# Patient Record
Sex: FEMALE | Race: WHITE
[De-identification: names, ages, dates, MRNs, and addresses within clinical notes are randomized per-mention and may not be internally consistent; named-entity substitution may affect disease eponyms.]

---

## 2018-10-30 ENCOUNTER — HOSPITAL ENCOUNTER (EMERGENCY)
Dept: HOSPITAL 97 - ER | Age: 23
Discharge: HOME | End: 2018-10-30
Payer: SELF-PAY

## 2018-10-30 DIAGNOSIS — Z72.0: ICD-10-CM

## 2018-10-30 DIAGNOSIS — J06.9: Primary | ICD-10-CM

## 2018-10-30 PROCEDURE — 99283 EMERGENCY DEPT VISIT LOW MDM: CPT

## 2018-10-30 NOTE — ER
Nurse's Notes                                                                                     

 [facility]                                                                

Name: Kati Rosaod                                                                               

Age: 23 yrs                                                                                       

Sex: Female                                                                                       

: 1995                                                                                   

MRN: [id number]                                                                                   

Arrival Date: 10/30/2018                                                                          

Time: 18:08                                                                                       

Account#: [id number]                                                                            

Bed 27                                                                                            

Private MD: None, None                                                                            

Diagnosis: Fever, unspecified;Acute upper respiratory infection, unspecified                      

                                                                                                  

Presentation:                                                                                     

10/30                                                                                             

18:32 Presenting complaint: Patient states: she woke up with body aches, ear ache, sore       kr2 

      throat, runny nose and cough this morning. Transition of care: patient was not received     

      from another setting of care. Onset of symptoms was 2018. Risk Assessment:      

      Do you want to hurt yourself or someone else? Patient reports no desire to harm self or     

      others. Initial Sepsis Screen: Does the patient meet any 2 criteria? No. Patient's          

      initial sepsis screen is negative. Does the patient have a suspected source of              

      infection? No. Patient's initial sepsis screen is negative. Care prior to arrival: None.    

18:32 Method Of Arrival: Ambulatory                                                           [facility] 

18:32 Acuity: JOSE ANTONIO 5                                                                           kr2 

                                                                                                  

Triage Assessment:                                                                                

18:37 General: Appears in no apparent distress. uncomfortable, well groomed, well developed,  kr2 

      well nourished, Behavior is calm, cooperative, appropriate for age. Pain: Complains of      

      pain in right ear, throat Pain currently is 3 out of 10 on a pain scale. Quality of         

      pain is described as aching, tender, Is continuous, Alleviated by nothing. EENT: Nares      

      with drainage noted bilaterally Oral mucosa is moist. Reports pain in right ear. Neuro:     

      Level of Consciousness is awake, alert, obeys commands, Oriented to person, place,          

      time, situation, Appropriate for age. Cardiovascular: Capillary refill < 3 seconds in       

      bilateral fingers Patient's skin is warm and dry. Respiratory: Reports cough that is        

      non-productive, Airway is patent Respiratory effort is even, unlabored, Respiratory         

      pattern is regular, symmetrical. GI: Patient currently denies nausea. : Denies            

      burning with urination. Derm: Skin is intact, is healthy with good turgor, Skin is          

      pink, warm \T\ dry. Musculoskeletal: Circulation, motion, and sensation intact.             

                                                                                                  

OB/GYN:                                                                                           

18:35 LMP N/A - Birth control method                                                          kr2 

                                                                                                  

Historical:                                                                                       

- Allergies:                                                                                      

18:35 No Known Allergies;                                                                     kr2 

- Home Meds:                                                                                      

18:35 Mirena IUD [Active];                                                                    kr2 

- PMHx:                                                                                           

18:35 None;                                                                                   kr2 

- PSHx:                                                                                           

18:35 None;                                                                                   kr2 

                                                                                                  

- Immunization history:: Adult Immunizations unknown.                                             

- Social history:: Smoking status: Patient uses tobacco products, denies chronic                  

  smoking, but will smoke occasionally.                                                           

- Ebola Screening: : No symptoms or risks identified at this time.                                

- Family history:: not pertinent.                                                                 

                                                                                                  

                                                                                                  

Screenin:35 Abuse screen: Denies threats or abuse. Denies injuries from another. Nutritional        kr2 

      screening: No deficits noted. Tuberculosis screening: No symptoms or risk factors           

      identified. Fall Risk None identified.                                                      

                                                                                                  

Assessment:                                                                                       

18:42 Reassessment: See triage assessment. Per Dr. Agrawal, urvashi flu swab, lab notified    kr2 

      and no swab done.                                                                           

                                                                                                  

Vital Signs:                                                                                      

18:35  / 90; Pulse 88; Resp 17; Temp 98.3; Pulse Ox 99% ; Weight 76.2 kg; Height 5 ft.  kr2 

      4 in. (162.56 cm); Pain 3/10;                                                               

18:35 Body Mass Index 28.84 (76.20 kg, 162.56 cm)                                             kr2 

                                                                                                  

ED Course:                                                                                        

18:08 Patient arrived in ED.                                                                  mr  

18:09 None, None is Private Physician.                                                        mr  

18:20 James Agrawal MD is Attending Physician.                                             cindy 

18:23 Vania Whiting, BLANCA is Primary Nurse.                                                     kr2 

18:34 Triage completed.                                                                       kr2 

18:38 James Agrawal MD is Referral Physician.                                              cindy 

18:40 Arm band placed on.                                                                     kr2 

18:40 Patient has correct armband on for positive identification. Bed in low position. Call   kr2 

      light in reach. Side rails up X 1. Door closed. Head of bed elevated.                       

18:40 No provider procedures requiring assistance completed. Patient did not have IV access   kr2 

      during this emergency room visit.                                                           

                                                                                                  

Administered Medications:                                                                         

18:50 Drug: Zithromax 500 mg Route: PO;                                                       kr2 

18:55 Follow up: Response: Medication administered at discharge.                              kr2 

18:50 Drug: Tamiflu 75 mg Route: PO;                                                          kr2 

19:02 Follow up: Response: Medication administered at discharge.                              kr2 

                                                                                                  

                                                                                                  

Outcome:                                                                                          

18:39 Discharge ordered by MD.                                                                cindy 

18:41 Condition: good                                                                         kr2 

18:53 Discharged to home ambulatory.                                                          kr2 

18:53 Discharge instructions given to patient, Instructed on discharge instructions, follow       

      up and referral plans. medication usage, Demonstrated understanding of instructions,        

      follow-up care, medications, Prescriptions given X 3.                                       

19:02 Patient left the ED.                                                                    kr2 

                                                                                                  

Signatures:                                                                                       

James Agrawal MD MD cha Rivera, Mary mr Reaves, Karey, RN                       RN   kr2                                                  

                                                                                                  

Corrections: (The following items were deleted from the chart)                                    

19:02 18:54 Response: No adverse reaction; Medication administered at discharge. kr2          kr2 

                                                                                                  

**************************************************************************************************

## 2019-03-09 ENCOUNTER — HOSPITAL ENCOUNTER (EMERGENCY)
Dept: HOSPITAL 97 - ER | Age: 24
Discharge: HOME | End: 2019-03-09
Payer: SELF-PAY

## 2019-03-09 DIAGNOSIS — Z91.040: ICD-10-CM

## 2019-03-09 DIAGNOSIS — J06.9: Primary | ICD-10-CM

## 2019-03-09 DIAGNOSIS — Z91.048: ICD-10-CM

## 2019-03-09 PROCEDURE — 87070 CULTURE OTHR SPECIMN AEROBIC: CPT

## 2019-03-09 PROCEDURE — 87804 INFLUENZA ASSAY W/OPTIC: CPT

## 2019-03-09 PROCEDURE — 99282 EMERGENCY DEPT VISIT SF MDM: CPT

## 2019-03-09 PROCEDURE — 87081 CULTURE SCREEN ONLY: CPT

## 2019-03-09 NOTE — EDPHYS
Physician Documentation                                                                           

 [facility]                                                                

Name: Kati Rosado                                                                               

Age: 23 yrs                                                                                       

Sex: Female                                                                                       

: 1995                                                                                   

MRN: [id number]                                                                                   

Arrival Date: 2019                                                                          

Time: 17:53                                                                                       

Account#: [id number]                                                                            

Bed Treatment                                                                                     

Private MD:                                                                                       

ED Physician James Agrawal                                                                      

HPI:                                                                                              

                                                                                             

18:55 This 23 yrs old  Female presents to ER via Ambulatory with complaints of Ear   jr8 

      Pain.                                                                                       

18:55 Patient stated that for the past 5 days she has had sinus congestion, cough, body       jr8 

      aches, ear pain, soar throat. Not getting any better with OTC medicine . Severity of        

      symptoms: At their worst the symptoms were moderate in the emergency department the         

      symptoms are unchanged. The patient has not experienced similar symptoms in the past.       

      The patient has not recently seen a physician.                                              

                                                                                                  

OB/GYN:                                                                                           

17:54 LMP 2019                                                                           ch  

                                                                                                  

Historical:                                                                                       

- Allergies:                                                                                      

17:54 Latex, Natural Rubber;                                                                  ch  

- Home Meds:                                                                                      

17:54 Mirena IUD [Active];                                                                    ch  

- PMHx:                                                                                           

17:54 None;                                                                                   ch  

- PSHx:                                                                                           

17:54 None;                                                                                   ch  

                                                                                                  

- Immunization history:: Adult Immunizations up to date, Flu vaccine is not up to date.           

- Social history:: Smoking status: Patient uses tobacco products, denies chronic                  

  smoking, but will smoke occasionally, Patient uses alcohol, weekly. Patient/guardian            

  denies using street drugs.                                                                      

- Ebola Screening: : Patient negative for fever greater than or equal to 101.5 degrees            

  Fahrenheit, and additional compatible Ebola Virus Disease symptoms Patient denies               

  exposure to infectious person Patient denies travel to an Ebola-affected area in the            

  21 days before illness onset No symptoms or risks identified at this time.                      

                                                                                                  

                                                                                                  

ROS:                                                                                              

18:55 Eyes: Negative for injury, pain, redness, and discharge, Neck: Negative for injury,     jr8 

      pain, and swelling, Cardiovascular: Negative for chest pain, palpitations, and edema,       

      Abdomen/GI: Negative for abdominal pain, nausea, vomiting, diarrhea, and constipation,      

      Back: Negative for injury and pain, MS/Extremity: Negative for injury and deformity,        

      Skin: Negative for injury, rash, and discoloration, Neuro: Negative for headache,           

      weakness, numbness, tingling, and seizure.                                                  

18:55 Constitutional: Positive for body aches, chills.                                            

18:55 ENT: Positive for rhinorrhea, sinus congestion, sinus pain, sore throat.                    

18:55 Respiratory: Positive for cough, Negative for dyspnea on exertion, shortness of breath,     

      sputum production, wheezing.                                                                

                                                                                                  

Exam:                                                                                             

18:55 Eyes:  Pupils equal round and reactive to light, extra-ocular motions intact.  Lids and jr8 

      lashes normal.  Conjunctiva and sclera are non-icteric and not injected.  Cornea within     

      normal limits.  Periorbital areas with no swelling, redness, or edema. ENT:  Nares          

      patent. Mild erythema to bilateral turbinates with clear rhinorrhea. No septal              

      abnormalities noted.  Tympanic membranes are normal and external auditory canals are        

      clear.  Oropharynx with no redness, swelling, or masses, exudates, or evidence of           

      obstruction, uvula midline.  Mucous membranes moist. Neck:  Trachea midline, no             

      thyromegaly or masses palpated, and no cervical lymphadenopathy.  Supple, full range of     

      motion without nuchal rigidity, or vertebral point tenderness.  No Meningismus.             

      Cardiovascular:  Regular rate and rhythm with a normal S1 and S2.  No gallops, murmurs,     

      or rubs.  Normal PMI, no JVD.  No pulse deficits. Respiratory:  Lungs have equal breath     

      sounds bilaterally, clear to auscultation and percussion.  No rales, rhonchi or wheezes     

      noted.  No increased work of breathing, no retractions or nasal flaring. Abdomen/GI:        

      Soft, non-tender, with normal bowel sounds.  No distension or tympany.  No guarding or      

      rebound.  No evidence of tenderness throughout. Back:  No spinal tenderness.  No            

      costovertebral tenderness.  Full range of motion. Skin:  Warm, dry with normal turgor.      

      Normal color with no rashes, no lesions, and no evidence of cellulitis. MS/ Extremity:      

      Pulses equal, no cyanosis.  Neurovascular intact.  Full, normal range of motion. Neuro:     

       Awake and alert, GCS 15, oriented to person, place, time, and situation.  Cranial          

      nerves II-XII grossly intact.  Motor strength 5/5 in all extremities.  Sensory grossly      

      intact.  Cerebellar exam normal.  Normal gait.                                              

                                                                                                  

Vital Signs:                                                                                      

17:54  / 85; Pulse 71; Resp 16; Temp 98.3; Pulse Ox 99% on R/A; Weight 83.91 kg; Height ch  

      5 ft. 4 in. (162.56 cm); Pain 7/10;                                                         

17:54 Body Mass Index 31.75 (83.91 kg, 162.56 cm)                                               

                                                                                                  

MDM:                                                                                              

18:03 Patient medically screened.                                                             [facility] 

18:55 Data reviewed: vital signs, nurses notes, lab test result(s), and as a result, I will   jr8 

      discharge patient. Data interpreted: Pulse oximetry: on room air is 99 %.                   

      Interpretation: normal. Counseling: I had a detailed discussion with the patient and/or     

      guardian regarding: the historical points, exam findings, and any diagnostic results        

      supporting the discharge/admit diagnosis, lab results, the need for outpatient follow       

      up, a family practitioner, to return to the emergency department if symptoms worsen or      

      persist or if there are any questions or concerns that arise at home.                       

                                                                                                  

                                                                                             

17:56 Order name: Flu; Complete Time: 18:55                                                     

                                                                                             

17:56 Order name: Strep; Complete Time: 18:55                                                   

                                                                                             

18:54 Order name: Throat Culture                                                              EDMS

                                                                                                  

Administered Medications:                                                                         

No medications were administered                                                                  

                                                                                                  

                                                                                                  

Disposition:                                                                                      

19 18:58 Discharged to Home. Impression: Acute upper respiratory infection, unspecified.    

- Condition is Stable.                                                                            

- Discharge Instructions: Upper Respiratory Infection, Adult.                                     

- Prescriptions for Prednisone 20 mg Oral Tablet - take 1 tablet by ORAL route once               

  daily for 5 days; 5 tablet. Zithromax Z- Jayme 250 mg Oral Tablet - take 1 tablet by              

  ORAL route as directed for 5 days Day 1 - take two (2) tablets one time. Day 2, 3, 4            

  , 5 take one (1) tablet once daily.; 6 tablet. Guaifenesin AC 10- 100 mg/5 mL Oral              

  Liquid - take 10 milliliter by ORAL route every 4 hours As needed; 240 milliliter.              

- Work release form, Medication Reconciliation Form, Thank You Letter, Antibiotic                 

  Education, Prescription Opioid Use form.                                                        

- Follow up: Private Physician; When: 5 - 6 days; Reason: Recheck today's complaints,             

  Continuance of care, Re-evaluation by your physician.                                           

- Problem is new.                                                                                 

- Symptoms have improved.                                                                         

                                                                                                  

                                                                                                  

                                                                                                  

Addendum:                                                                                         

2019                                                                                        

     09:28 Co-signature as Attending Physician, James Agrawal MD I agree with the assessment and  c
ha

           plan of care.                                                                          

                                                                                                  

Signatures:                                                                                       

Dispatcher [employer]                           EDDenisse Rocha RN RN ch Anderson, Corey, MD MD cha Smirch, Shelby, RN RN                                                      

Héctor Jones PA PA   jr8                                                  

                                                                                                  

Corrections: (The following items were deleted from the chart)                                    

                                                                                             

19:32 18:58 2019 18:58 Discharged to Home. Impression: Acute upper respiratory          ss  

      infection, unspecified. Condition is Stable. Forms are Medication Reconciliation Form,      

      Thank You Letter, Antibiotic Education, Prescription Opioid Use. Follow up: Private         

      Physician; When: 5 - 6 days; Reason: Recheck today's complaints, Continuance of care,       

      Re-evaluation by your physician. Problem is new. Symptoms have improved. jr8                

                                                                                                  

**************************************************************************************************

## 2019-03-09 NOTE — ER
Nurse's Notes                                                                                     

 [facility]                                                                

Name: Kati Rosado                                                                               

Age: 23 yrs                                                                                       

Sex: Female                                                                                       

: 1995                                                                                   

MRN: [id number]                                                                                   

Arrival Date: 2019                                                                          

Time: 17:53                                                                                       

Account#: [id number]                                                                            

Bed Treatment                                                                                     

Private MD:                                                                                       

Diagnosis: Acute upper respiratory infection, unspecified                                         

                                                                                                  

Presentation:                                                                                     

                                                                                             

17:53 Presenting complaint: Patient states: terrie ear pain, cough, congestion, yellow mucous,   ch  

      started 4-5 days ago, sore throat as well. Transition of care: patient was not received     

      from another setting of care. Onset of symptoms was 2019. Risk Assessment: Do     

      you want to hurt yourself or someone else?. Initial Sepsis Screen: Does the patient         

      meet any 2 criteria? No. Patient's initial sepsis screen is negative. Does the patient      

      have a suspected source of infection? No. Patient's initial sepsis screen is negative.      

      Care prior to arrival: Medication(s) given: amoxicillin.                                    

17:53 Method Of Arrival: Ambulatory                                                             

17:53 Acuity: JOSE ANTONIO 4                                                                             

                                                                                                  

Triage Assessment:                                                                                

17:54 General: Appears in no apparent distress. comfortable, Behavior is calm, cooperative,   ch  

      appropriate for age. Pain: Complains of pain in throat Pain currently is 7 out of 10 on     

      a pain scale. EENT: Reports nasal congestion nasal discharge pain in left ear and right     

      ear when swallowing. Respiratory: Airway is patent Respiratory effort is even,              

      unlabored.                                                                                  

                                                                                                  

OB/GYN:                                                                                           

17:54 [facility] 2019                                                                             

                                                                                                  

Historical:                                                                                       

- Allergies:                                                                                      

17:54 Latex, Natural Rubber;                                                                    

- Home Meds:                                                                                      

17:54 Mirena IUD [Active];                                                                    ch  

- PMHx:                                                                                           

17:54 None;                                                                                   ch  

- PSHx:                                                                                           

17:54 None;                                                                                     

                                                                                                  

- Immunization history:: Adult Immunizations up to date, Flu vaccine is not up to date.           

- Social history:: Smoking status: Patient uses tobacco products, denies chronic                  

  smoking, but will smoke occasionally, Patient uses alcohol, weekly. Patient/guardian            

  denies using street drugs.                                                                      

- Ebola Screening: : Patient negative for fever greater than or equal to 101.5 degrees            

  Fahrenheit, and additional compatible Ebola Virus Disease symptoms Patient denies               

  exposure to infectious person Patient denies travel to an Ebola-affected area in the            

  21 days before illness onset No symptoms or risks identified at this time.                      

                                                                                                  

                                                                                                  

Screenin:14 Abuse screen: Denies threats or abuse. Denies injuries from another. Nutritional        ss  

      screening: No deficits noted. Tuberculosis screening: Never had TB. Fall Risk None          

      identified.                                                                                 

                                                                                                  

Assessment:                                                                                       

18:18 General: Appears in no apparent distress. comfortable, Behavior is calm, cooperative.   ss  

      Pain: Complains of pain in right ear and left ear and throat Pain currently is 7 out of     

      10 on a pain scale. Quality of pain is described as aching, tender. Neuro: Level of         

      Consciousness is awake, alert, obeys commands, Oriented to person, place, time,             

      situation. Cardiovascular: Capillary refill < 3 seconds is brisk in bilateral fingers.      

      Respiratory: Airway is patent Respiratory effort is even, unlabored, Respiratory            

      pattern is regular, symmetrical. GI: Patient currently denies diarrhea, nausea,             

      vomiting. EENT: Nares are clear Oral mucosa is moist. Throat is clear. Derm: Skin is        

      intact, is healthy with good turgor, Skin is dry, Skin is pink, warm \T\ dry. normal.       

      Musculoskeletal: Circulation, motion, and sensation intact. Range of motion: intact in      

      all extremities.                                                                            

                                                                                                  

Vital Signs:                                                                                      

17:54  / 85; Pulse 71; Resp 16; Temp 98.3; Pulse Ox 99% on R/A; Weight 83.91 kg; Height ch  

      5 ft. 4 in. (162.56 cm); Pain 7/10;                                                         

17:54 Body Mass Index 31.75 (83.91 kg, 162.56 cm)                                               

                                                                                                  

ED Course:                                                                                        

17:53 Patient arrived in ED.                                                                    

17:54 Triage completed.                                                                         

17:54 Arm band placed on left wrist. Patient placed in waiting room. flu and strep.             

17:56 Héctor Jones PA is PHCP.                                                               [facility] 

17:56 James Agrawal MD is Attending Physician.                                             [id number] 

18:14 Patient has correct armband on for positive identification. Bed in low position. Call   ss  

      light in reach.                                                                             

18:14 No provider procedures requiring assistance completed. Patient did not have IV access   ss  

      during this emergency room visit.                                                           

19:31 Rubia Camilo, RN is Primary Nurse.                                                    ss  

                                                                                                  

Administered Medications:                                                                         

No medications were administered                                                                  

                                                                                                  

                                                                                                  

Outcome:                                                                                          

18:58 Discharge ordered by MD.                                                                mar 

19:31 Discharged to home ambulatory.                                                          ss  

19:31 Condition: good                                                                             

19:31 Discharge instructions given to patient, family, Instructed on discharge instructions,      

      follow up and referral plans. medication usage, Demonstrated understanding of               

      instructions, follow-up care, medications, Prescriptions given X 3.                         

19:32 Patient left the ED.                                                                      

                                                                                                  

Signatures:                                                                                       

Denisse Manzanares RN RN                                                      

Rubia Camilo RN RN                                                      

Héctor Jones PA PA   jr8                                                  

                                                                                                  

Corrections: (The following items were deleted from the chart)                                    

17:54 17:53 Care prior to arrival: None. [facility]  

                                                                                                  

**************************************************************************************************

## 2020-12-06 ENCOUNTER — HOSPITAL ENCOUNTER (EMERGENCY)
Dept: HOSPITAL 97 - ER | Age: 25
Discharge: HOME | End: 2020-12-06
Payer: SELF-PAY

## 2020-12-06 DIAGNOSIS — Z20.828: ICD-10-CM

## 2020-12-06 DIAGNOSIS — Z91.040: ICD-10-CM

## 2020-12-06 DIAGNOSIS — J06.9: Primary | ICD-10-CM

## 2020-12-06 DIAGNOSIS — Z91.048: ICD-10-CM

## 2020-12-06 PROCEDURE — 87070 CULTURE OTHR SPECIMN AEROBIC: CPT

## 2020-12-06 PROCEDURE — 87804 INFLUENZA ASSAY W/OPTIC: CPT

## 2020-12-06 PROCEDURE — 87081 CULTURE SCREEN ONLY: CPT

## 2020-12-06 PROCEDURE — 99283 EMERGENCY DEPT VISIT LOW MDM: CPT

## 2020-12-06 NOTE — ER
Nurse's Notes                                                                                     

 [facility]                                                                 

Name: Kati Rosado                                                                               

Age: 25 yrs                                                                                       

Sex: Female                                                                                       

: 1995                                                                                   

MRN: [id number]                                                                                   

Arrival Date: 2020                                                                          

Time: 12:44                                                                                       

Account#: [id number]                                                                            

Bed 5                                                                                             

Private MD:                                                                                       

Diagnosis: Acute upper respiratory infection, unspecified                                         

                                                                                                  

Presentation:                                                                                     

                                                                                             

13:14 Chief complaint: Patient states: Started coughing today, throat started hurting         ca1 

      yesterday. When I cough, it hurts my chest and throat, I almost throw up from coughing      

      at work today. Denies fever. Coronavirus screen: Client denies travel out of the [place]       

      in the last 14 days. cough unrelated to allergies, shortness of breath, Client presents     

      with at least one sign or symptom that may indicate coronavirus-19. Standard/surgical       

      mask placed on the client. Provider contacted for isolation considerations. The client      

      reports previous COVID testing was negative. Date of collection: 2020. Ebola      

      Screen: Patient negative for fever greater than or equal to 101.5 degrees Fahrenheit,       

      and additional compatible Ebola Virus Disease symptoms Patient denies exposure to           

      infectious person. Patient denies travel to an Ebola-affected area in the 21 days           

      before illness onset. No symptoms or risks identified at this time. Initial Sepsis          

      Screen: Does the patient meet any 2 criteria? No. Patient's initial sepsis screen is        

      negative. Does the patient have a suspected source of infection? No. Patient's initial      

      sepsis screen is negative. Risk Assessment: Do you want to hurt yourself or someone         

      else? Patient reports no desire to harm self or others. Onset of symptoms was 2020.                                                                                   

13:14 Method Of Arrival: Ambulatory                                                           ca1 

13:14 Acuity: JOSE ANTONIO 4                                                                           ca1 

                                                                                                  

OB/GYN:                                                                                           

13:16 LMP 2020                                                                          ca1 

                                                                                                  

Historical:                                                                                       

- Allergies:                                                                                      

13:16 Latex, Natural Rubber;                                                                  ca1 

- Home Meds:                                                                                      

13:16 None [Active];                                                                          ca1 

- PMHx:                                                                                           

13:16 None;                                                                                   ca1 

- PSHx:                                                                                           

13:16 None;                                                                                   ca1 

                                                                                                  

- Immunization history:: Adult Immunizations up to date, Flu vaccine is not up to date.           

- Social history:: Smoking status: Patient denies any tobacco usage or history of.                

                                                                                                  

                                                                                                  

Screenin:01 Abuse screen: Denies threats or abuse. Denies injuries from another. Nutritional        ca1 

      screening: No deficits noted. Tuberculosis screening: No symptoms or risk factors           

      identified. Fall Risk None identified.                                                      

                                                                                                  

Assessment:                                                                                       

14:01 Reassessment: Pt seen in triage with MARIO Rizvi. General: Appears in no apparent    ca1 

      distress. comfortable, Behavior is calm, cooperative, appropriate for age. Pain:            

      Complains of pain in throat. Neuro: Level of Consciousness is awake, alert, obeys           

      commands, Oriented to person, place, time, situation. Cardiovascular: Heart tones S1 S2     

      present Capillary refill < 3 seconds Patient's skin is warm and dry. Respiratory:           

      Airway is patent Respiratory effort is even, unlabored, Respiratory pattern is regular,     

      symmetrical, Breath sounds are clear bilaterally. EENT: Throat is clear is pink. Derm:      

      Skin is intact, is healthy with good turgor, Skin is pink, warm \T\ dry. Musculoskeletal:   

      Circulation, motion, and sensation intact. Capillary refill < 3 seconds.                    

14:32 Reassessment: Patient appears in no apparent distress at this time. No changes from     sv  

      previously documented assessment. Patient and/or family updated on plan of care and         

      expected duration. Pain level reassessed. Patient is alert, oriented x 3, equal             

      unlabored respirations, skin warm/dry/pink.                                                 

                                                                                                  

Vital Signs:                                                                                      

13:14  / 77; Pulse 88; Resp 16 S; Temp 97.9(TE); Pulse Ox 100% on R/A; Weight 81.65 kg  ca1 

      (R); Height 5 ft. 4 in. (162.56 cm) (R); Pain 4/10;                                         

14:05  / 81; Pulse 83; Resp 16 S; Pulse Ox 100% on R/A;                                 ca1 

13:14 Body Mass Index 30.90 (81.65 kg, 162.56 cm)                                             ca1 

                                                                                                  

ED Course:                                                                                        

12:44 Patient arrived in ED.                                                                  ds1 

13:16 Triage completed.                                                                       ca1 

13:16 Arm band placed on right wrist.                                                         ca1 

13:30 James Gonzalez PA is PHCP.                                                                cp  

13:32 James Gonzalez PA is PHCP.                                                                cp  

13:32 Washington Ballesteros MD is Attending Physician.                                                cp  

13:32 Flu Sent.                                                                               ca1 

13:32 Strep Sent.                                                                             ca1 

13:32 COVID-19 Sent.                                                                          ca1 

14:01 Patient has correct armband on for positive identification.                             ca1 

14:01 No provider procedures requiring assistance completed. Patient did not have IV access   ca1 

      during this emergency room visit.                                                           

14:29 Mana Christensen, RN is Primary Nurse.                                                  sv  

14:30 Throat Culture Sent.                                                                    sv  

                                                                                                  

Administered Medications:                                                                         

14:09 Drug: Tessalon Perle 200 mg Route: PO;                                                  ca1 

14:32 Follow up: Response: No adverse reaction                                                sv  

                                                                                                  

                                                                                                  

Outcome:                                                                                          

14:45 Discharge ordered by MD.                                                                cp  

14:55 Patient left the ED.                                                                    sv  

14:55 Discharged to home ambulatory.                                                          sv  

14:55 Condition: stable                                                                           

14:55 Discharge instructions given to patient, Instructed on discharge instructions, follow       

      up and referral plans. medication usage, Demonstrated understanding of instructions,        

      follow-up care, medications, Prescriptions given X 1.                                       

                                                                                                  

Addendum:                                                                                         

2020                                                                                        

     17:42 Addendum: COVID-19 Result: Negative result given to RN to notify pt. Notified pt of     a
a5

           negative COVID 19 swab results. Pt advised that even with a negative test result they  

           should remain in isolation until symptom free for 3 days without medication. Pt also   

           advised to return to the ED for worsening symptoms.                                    

                                                                                                  

Signatures:                                                                                       

Mana Christensen, RN                    RN                                                      

Johanna Nj                                ds1                                                  

Rosalina Quiñones, RN                     RN   aa5                                                  

James Gonzalez PA                         PA   Dianne Franco RN                        RN   ca1                                                  

                                                                                                  

**************************************************************************************************

## 2020-12-06 NOTE — EDPHYS
Physician Documentation                                                                           

 [facility]                                                                 

Name: Kati Rosado                                                                               

Age: 25 yrs                                                                                       

Sex: Female                                                                                       

: 1995                                                                                   

MRN: [id number]                                                                                   

Arrival Date: 2020                                                                          

Time: 12:44                                                                                       

Account#: [id number]                                                                            

Bed 5                                                                                             

Private MD:                                                                                       

ED Physician Washington Ballesteros                                                                         

HPI:                                                                                              

                                                                                             

13:30 This 25 yrs old  Female presents to ER via Ambulatory with complaints of Sore  cp  

      Throat, Cough.                                                                              

13:30 The patient presents with sore throat. Onset: The symptoms/episode began/occurred       cp  

      yesterday. Severity of symptoms: in the emergency department the symptoms are               

      unchanged. Associated signs and symptoms: Pertinent positives: cough, Pertinent             

      negatives diarrhea, dysphagia, fever, vomiting.                                             

                                                                                                  

OB/GYN:                                                                                           

13:16 LMP 2020                                                                          ca1 

                                                                                                  

Historical:                                                                                       

- Allergies:                                                                                      

13:16 Latex, Natural Rubber;                                                                  ca1 

- Home Meds:                                                                                      

13:16 None [Active];                                                                          ca1 

- PMHx:                                                                                           

13:16 None;                                                                                   ca1 

- PSHx:                                                                                           

13:16 None;                                                                                   ca1 

                                                                                                  

- Immunization history:: Adult Immunizations up to date, Flu vaccine is not up to date.           

- Social history:: Smoking status: Patient denies any tobacco usage or history of.                

                                                                                                  

                                                                                                  

ROS:                                                                                              

13:35 Constitutional: Negative for fever, poor PO intake.                                     cp  

13:35 Eyes: Negative for injury, pain, redness, and discharge.                                cp  

13:35 ENT: Positive for ear pain, sore throat, Negative for drainage from ear(s), difficulty      

      swallowing, difficulty handling secretions.                                                 

13:35 Respiratory: Positive for cough, Negative for shortness of breath, wheezing.                

13:35 Abdomen/GI: Negative for abdominal pain, nausea, vomiting, and diarrhea.                    

13:35 Neuro: Negative for headache.                                                               

13:35 All other systems are negative.                                                             

                                                                                                  

Exam:                                                                                             

13:40 Constitutional: The patient appears in no acute distress, alert, awake, non-toxic, well cp  

      developed, well nourished.                                                                  

13:40 Head/Face:  Normocephalic, atraumatic.                                                  cp  

13:40 Eyes: Periorbital structures: appear normal, Conjunctiva: normal, no exudate, no            

      injection, Lids and lashes: appear normal, bilaterally.                                     

13:40 ENT: External ear(s): are unremarkable, Ear canal(s): are normal, clear, TM's:              

      dullness, bilaterally, Nose: is normal, Mouth: Lips: moist, Oral mucosa: moist,             

      Posterior pharynx: Airway: no evidence of obstruction, patent, Tonsils: no enlargement,     

      no exudate, swelling, is not appreciated, erythema, that is mild, exudate, is not           

      appreciated.                                                                                

13:40 Neck: ROM/movement: is normal, is supple, no meningismus, no nuchal rigidity, Lymph         

      nodes: no appreciated lymphadenopathy.                                                      

13:40 Chest/axilla: Inspection: normal.                                                           

13:40 Cardiovascular: Rate: normal.                                                               

13:40 Respiratory: the patient does not display signs of respiratory distress,  Respirations:     

      normal, no use of accessory muscles, no retractions, labored breathing, is not present,     

      Breath sounds: decreased breath sounds, are not appreciated, stridor, wheezing: is not      

      appreciated.                                                                                

13:40 Abdomen/GI: Exam negative for discomfort, distension, guarding, Inspection: abdomen         

      appears normal.                                                                             

                                                                                                  

Vital Signs:                                                                                      

13:14  / 77; Pulse 88; Resp 16 S; Temp 97.9(TE); Pulse Ox 100% on R/A; Weight 81.65 kg  ca1 

      (R); Height 5 ft. 4 in. (162.56 cm) (R); Pain 4/10;                                         

14:05  / 81; Pulse 83; Resp 16 S; Pulse Ox 100% on R/A;                                 ca1 

13:14 Body Mass Index 30.90 (81.65 kg, 162.56 cm)                                             ca1 

                                                                                                  

MDM:                                                                                              

14:00 Differential diagnosis: apthous stomatitis, group A strep tonsillitis, peritonsillar    cp  

      abscess pharyngitis, retropharyngeal abcess.                                                

14:44 Patient medically screened.                                                             cp  

14:45 Data reviewed: vital signs, nurses notes, lab test result(s), and as a result, I will   cp  

      discharge patient.                                                                          

14:45 Counseling: I had a detailed discussion with the patient and/or guardian regarding: the cp  

      historical points, exam findings, and any diagnostic results supporting the                 

      discharge/admit diagnosis, lab results, to return to the emergency department if            

      symptoms worsen or persist or if there are any questions or concerns that arise at home.    

                                                                                                  

                                                                                             

13:21 Order name: Flu                                                                         ca1 

                                                                                             

13:21 Order name: Strep                                                                       ca1 

                                                                                             

13:21 Order name: COVID-19                                                                    ca1 

                                                                                             

14:27 Order name: Throat Culture                                                              EDMS

                                                                                                  

Administered Medications:                                                                         

14:09 Drug: Tessalon Perle 200 mg Route: PO;                                                  ca1 

14:32 Follow up: Response: No adverse reaction                                                sv  

                                                                                                  

                                                                                                  

Disposition:                                                                                      

                                                                                             

09:31 Co-signature as Attending Physician, Washington Ballesteros MD.                                    rn  

                                                                                                  

Disposition:                                                                                      

20 14:45 Discharged to Home. Impression: Acute upper respiratory infection, unspecified.    

- Condition is Stable.                                                                            

- Discharge Instructions: Upper Respiratory Infection, Adult, COVID-19.                           

- Prescriptions for Tessalon Perles 100 mg Oral Capsule - take 2 capsule by ORAL route            

  every 8 hours As needed; 20 capsule.                                                            

- Work release form, Medication Reconciliation Form, Thank You Letter, Antibiotic                 

  Education, Prescription Opioid Use form.                                                        

- Follow up: Private Physician; When: 2 - 3 days; Reason: Worsening of condition.                 

- Problem is new.                                                                                 

- Symptoms have improved.                                                                         

                                                                                                  

                                                                                                  

                                                                                                  

Signatures:                                                                                       

Dispatcher [employer]                           EDMS                                                 

Mana Christensen RN                    RN   Washington Orosco MD MD rn Page, Corey, PA PA   cp                                                   

Dianne Greenwood RN                        RN   ca1                                                  

                                                                                                  

Corrections: (The following items were deleted from the chart)                                    

                                                                                             

14:55 14:45 2020 14:45 Discharged to Home. Impression: Acute upper respiratory          sv  

      infection, unspecified. Condition is Stable. Forms are Medication Reconciliation Form,      

      Thank You Letter, Antibiotic Education, Prescription Opioid Use. Follow up: Private         

      Physician; When: 2 - 3 days; Reason: Worsening of condition. Problem is new. Symptoms       

      have improved. cp                                                                           

                                                                                                  

**************************************************************************************************

## 2020-12-10 VITALS — DIASTOLIC BLOOD PRESSURE: 81 MMHG | SYSTOLIC BLOOD PRESSURE: 124 MMHG

## 2020-12-10 VITALS — OXYGEN SATURATION: 100 % | TEMPERATURE: 97.9 F

## 2021-01-15 ENCOUNTER — HOSPITAL ENCOUNTER (EMERGENCY)
Dept: HOSPITAL 97 - ER | Age: 26
Discharge: HOME | End: 2021-01-15
Payer: SELF-PAY

## 2021-01-15 VITALS — SYSTOLIC BLOOD PRESSURE: 108 MMHG | OXYGEN SATURATION: 99 % | DIASTOLIC BLOOD PRESSURE: 88 MMHG

## 2021-01-15 VITALS — TEMPERATURE: 98.4 F

## 2021-01-15 DIAGNOSIS — R09.1: Primary | ICD-10-CM

## 2021-01-15 DIAGNOSIS — Z91.040: ICD-10-CM

## 2021-01-15 DIAGNOSIS — Z91.048: ICD-10-CM

## 2021-01-15 PROCEDURE — 99283 EMERGENCY DEPT VISIT LOW MDM: CPT

## 2021-01-15 PROCEDURE — 71045 X-RAY EXAM CHEST 1 VIEW: CPT

## 2021-01-15 NOTE — ER
Nurse's Notes                                                                                     

 [facility]                                                                 

Name: Kati Rosado                                                                               

Age: 25 yrs                                                                                       

Sex: Female                                                                                       

: 1995                                                                                   

MRN: [id number]                                                                                   

Arrival Date: 01/15/2021                                                                          

Time: 12:02                                                                                       

Account#: [id number]                                                                            

Bed 24                                                                                            

Private MD:                                                                                       

Diagnosis: Pleurisy                                                                               

                                                                                                  

Presentation:                                                                                     

01/15                                                                                             

12:15 Chief complaint: Patient states: Left mid back pain with deep inspiration for 1 week.   ll1 

      Cough since December, she came here for. Covid negative last visit. Coronavirus screen:     

      Client denies travel out of the [place] in the last 14 days. cough unrelated to allergies,     

      Client presents with at least one sign or symptom that may indicate coronavirus-19.         

      Standard/surgical mask placed on the client. Ebola Screen: Patient denies travel to an      

      Ebola-affected area in the 21 days before illness onset. Initial Sepsis Screen: Does        

      the patient meet any 2 criteria? No. Patient's initial sepsis screen is negative. Does      

      the patient have a suspected source of infection? Yes: Productive cough/pneumonia. Risk     

      Assessment: Do you want to hurt yourself or someone else? Patient reports no desire to      

      harm self or others. Onset of symptoms was 2020.                               

12:15 Method Of Arrival: Ambulatory                                                           ll1 

12:15 Acuity: JOSE ANTONIO 4                                                                           ll1 

                                                                                                  

Triage Assessment:                                                                                

12:17 General: Appears in no apparent distress. Behavior is calm, cooperative, appropriate    ll1 

      for age. General: intermittent pain to left mid back with deep inspiration, no pain at      

      this time. . Pain: Denies pain. Neuro: No deficits noted. Cardiovascular: No deficits       

      noted. Respiratory: Reports pain with respiration Airway is patent Trachea midline          

      Respiratory effort is even, unlabored, Respiratory pattern is regular, symmetrical, the     

      patient has mild shortness of breath. Musculoskeletal: Circulation, motion, and             

      sensation intact. Capillary refill < 3 seconds, Range of motion: intact in all              

      extremities, Reports pain in L mid back with inspiration.                                   

                                                                                                  

Historical:                                                                                       

- Allergies:                                                                                      

12:15 Latex, Natural Rubber;                                                                  ll1 

- PMHx:                                                                                           

12:15 None;                                                                                   ll1 

- PSHx:                                                                                           

12:15 None;                                                                                   ll1 

                                                                                                  

- Immunization history:: Flu vaccine is not up to date.                                           

- Social history:: Smoking status: Patient denies any tobacco usage or history of.                

                                                                                                  

                                                                                                  

Screenin:00 Abuse screen: Denies threats or abuse. Nutritional screening: No deficits noted.        vg1 

      Tuberculosis screening: No symptoms or risk factors identified. Fall Risk None              

      identified.                                                                                 

                                                                                                  

Assessment:                                                                                       

13:55 General: Appears in no apparent distress. comfortable, Behavior is calm, cooperative.   vg1 

      Pain: Denies pain. Complains of pain in Left mid back. Pain currently is 0 out of 10 on     

      a pain scale. at worst was 8 out of 10 on a pain scale. Is intermittent. Neuro: Level       

      of Consciousness is awake, alert, obeys commands, Oriented to person, place, time,          

      situation. Cardiovascular: Patient's skin is warm and dry. Respiratory: Airway is           

      patent Respiratory effort is even, unlabored, Breath sounds are clear bilaterally. GI:      

      Abdomen is flat, Patient currently denies constipation, diarrhea, nausea, vomiting. :     

      No signs and/or symptoms were reported regarding the genitourinary system. EENT: No         

      signs and/or symptoms were reported regarding the EENT system. Derm: Skin is intact, is     

      healthy with good turgor. Musculoskeletal: Circulation, motion, and sensation intact.       

15:00 Reassessment: Patient appears in no apparent distress at this time. Patient and/or      vg1 

      family updated on plan of care and expected duration. Pain level reassessed. Patient is     

      alert, oriented x 3, equal unlabored respirations, skin warm/dry/pink. Patient denies       

      pain at this time.                                                                          

                                                                                                  

Vital Signs:                                                                                      

12:15  / 86; Pulse 86; Resp 16; Temp 98.4; Pulse Ox 99% ; Weight 72.57 kg; Height 5 ft. ll1 

      4 in. (162.56 cm); Pain 9/10;                                                               

14:00  / 86; Pulse 63; Resp 16; Pulse Ox 98% on R/A;                                    vg1 

15:00  / 88; Pulse 70; Resp 14; Pulse Ox 99% on R/A;                                    vg1 

12:15 Body Mass Index 27.46 (72.57 kg, 162.56 cm)                                             ll1 

                                                                                                  

ED Course:                                                                                        

12:02 Patient arrived in ED.                                                                  rg4 

12:14 Arm band placed on.                                                                     ll1 

12:17 Triage completed.                                                                       ll1 

13:51 [facility]                                                        kb  

13:51 Lei Yee MD is Attending Physician.                                              kb  

13:52 Kandi Singleton RN is Primary Nurse.                                                  vg1 

14:01 Patient has correct armband on for positive identification. Bed in low position. Call   vg1 

      light in reach.                                                                             

14:48 Xray at bedside.                                                                        vg1 

14:50 Xray leaving bedside.                                                                   vg1 

14:59 Chest Single View XRAY In Process Unspecified.                                          EDMS

15:26 No provider procedures requiring assistance completed. Patient did not have IV access   vg1 

      during this emergency room visit.                                                           

                                                                                                  

Administered Medications:                                                                         

No medications were administered                                                                  

                                                                                                  

                                                                                                  

Outcome:                                                                                          

15:13 Discharge ordered by MD.                                                                betsy  

15:26 Discharged to home ambulatory.                                                          vg1 

15:26 Condition: stable                                                                           

15:26 Discharge instructions given to patient, Instructed on discharge instructions, follow       

      up and referral plans. medication usage, Demonstrated understanding of instructions,        

      follow-up care, medications, Prescriptions given X 1.                                       

15:27 Patient left the ED.                                                                    vg1 

                                                                                                  

Signatures:                                                                                       

Dispatcher [employer]                           EDMS                                                 

Sharda Sharma, WILBERT PINTO-Catalina Velasquez                                 rg4                                                  

Kandi Singleton, RN                    RN   vg1                                                  

Wojciech Lee RN                       RN   ll1                                                  

                                                                                                  

**************************************************************************************************

## 2021-01-15 NOTE — RAD REPORT
EXAM DESCRIPTION:  RAD - Chest Single View - 1/15/2021 2:59 pm

 

CLINICAL HISTORY:  Pain;Cough

Chest pain.

 

COMPARISON:  No comparisons

 

FINDINGS:  Portable technique limits examination quality.

 

The lungs are grossly clear. The heart is normal in size. No displaced fractures.

 

IMPRESSION:  No acute intrathoracic process suspected.

## 2021-01-15 NOTE — EDPHYS
Physician Documentation                                                                           

 [facility]                                                                 

Name: Kati Rosado                                                                               

Age: 25 yrs                                                                                       

Sex: Female                                                                                       

: 1995                                                                                   

MRN: [id number]                                                                                   

Arrival Date: 01/15/2021                                                                          

Time: 12:02                                                                                       

Account#: [id number]                                                                            

Bed 24                                                                                            

Private MD:                                                                                       

ED Physician Lei Yee                                                                       

HPI:                                                                                              

01/15                                                                                             

13:56 This 25 yrs old  Female presents to ER via Ambulatory with complaints of Back  kb  

      Pain.                                                                                       

13:56 The patient presents with pain that is acute, with no known mechanism of injury. The    kb  

      symptoms are located in the left subscapular area. Onset: The symptoms/episode              

      began/occurred 2 week(s) ago. The pain does not radiate. Associated signs and symptoms:     

      The patient has no apparent associated signs or symptoms. Severity of symptoms: At          

      their worst the symptoms were moderate, in the emergency department the symptoms are        

      unchanged. The patient has not experienced similar symptoms in the past. The patient        

      has not recently seen a physician.                                                          

13:59 The problem was sustained without known cause. Modifying factors: The patient symptoms  kb  

      are alleviated by nothing, the patient symptoms are aggravated by deep breath. Pt           

      reports pain to left mid back with inspiration. STates it has been going on                 

      intermittently for 2 weeks. Reports slight cough since December.                            

                                                                                                  

Historical:                                                                                       

- Allergies:                                                                                      

12:15 Latex, Natural Rubber;                                                                  ll1 

- PMHx:                                                                                           

12:15 None;                                                                                   ll1 

- PSHx:                                                                                           

12:15 None;                                                                                   ll1 

                                                                                                  

- Immunization history:: Flu vaccine is not up to date.                                           

- Social history:: Smoking status: Patient denies any tobacco usage or history of.                

                                                                                                  

                                                                                                  

ROS:                                                                                              

13:54 Constitutional: Negative for fever, chills, and weight loss, Cardiovascular: Negative   kb  

      for chest pain, palpitations, and edema, Respiratory: Negative for shortness of breath,     

      cough, wheezing, and pleuritic chest pain, Abdomen/GI: Negative for abdominal pain,         

      nausea, vomiting, diarrhea, and constipation, MS/Extremity: Negative for injury and         

      deformity, Skin: Negative for injury, rash, and discoloration, Neuro: Negative for          

      headache, weakness, numbness, tingling, and seizure.                                        

13:54 Back: Positive for of the left subscapular area, pain with inspiration.                     

                                                                                                  

Exam:                                                                                             

13:54 Constitutional:  This is a well developed, well nourished patient who is awake, alert,  kb  

      and in no acute distress. Head/Face:  Normocephalic, atraumatic. Chest/axilla:  Normal      

      chest wall appearance and motion.  Nontender with no deformity.  No lesions are             

      appreciated. Cardiovascular:  Regular rate and rhythm with a normal S1 and S2.  No          

      gallops, murmurs, or rubs.  Normal PMI, no JVD.  No pulse deficits. Respiratory:  Lungs     

      have equal breath sounds bilaterally, clear to auscultation and percussion.  No rales,      

      rhonchi or wheezes noted.  No increased work of breathing, no retractions or nasal          

      flaring. Abdomen/GI:  Soft, non-tender, with normal bowel sounds.  No distension or         

      tympany.  No guarding or rebound.  No evidence of tenderness throughout. Back:  No          

      spinal tenderness.  No costovertebral tenderness.  Full range of motion. Skin:  Warm,       

      dry with normal turgor.  Normal color with no rashes, no lesions, and no evidence of        

      cellulitis. MS/ Extremity:  Pulses equal, no cyanosis.  Neurovascular intact.  Full,        

      normal range of motion. Neuro:  Awake and alert, GCS 15, oriented to person, place,         

      time, and situation.  Cranial nerves II-XII grossly intact.  Motor strength 5/5 in all      

      extremities.  Sensory grossly intact.  Cerebellar exam normal.  Normal gait.                

                                                                                                  

Vital Signs:                                                                                      

12:15  / 86; Pulse 86; Resp 16; Temp 98.4; Pulse Ox 99% ; Weight 72.57 kg; Height 5 ft. ll1 

      4 in. (162.56 cm); Pain 9/10;                                                               

14:00  / 86; Pulse 63; Resp 16; Pulse Ox 98% on R/A;                                    vg1 

15:00  / 88; Pulse 70; Resp 14; Pulse Ox 99% on R/A;                                    vg1 

12:15 Body Mass Index 27.46 (72.57 kg, 162.56 cm)                                             ll1 

                                                                                                  

MDM:                                                                                              

13:51 Patient medically screened.                                                             kb  

13:56 Data reviewed: vital signs, nurses notes. Data interpreted: Pulse oximetry: on room air kb  

      is 99 %. Interpretation: normal.                                                            

15:12 Counseling: I had a detailed discussion with the patient and/or guardian regarding: the kb  

      historical points, exam findings, and any diagnostic results supporting the                 

      discharge/admit diagnosis, radiology results, the need for outpatient follow up, a          

      family practitioner, to return to the emergency department if symptoms worsen or            

      persist or if there are any questions or concerns that arise at home.                       

                                                                                                  

01/15                                                                                             

13:09 Order name: Chest Single View XRAY; Complete Time: 15:12                                kb  

                                                                                                  

Administered Medications:                                                                         

No medications were administered                                                                  

                                                                                                  

                                                                                                  

Disposition:                                                                                      

                                                                                             

14:22 Co-signature as Attending Physician, Lei Yee MD I agree with the assessment and   kdr 

      plan of care.                                                                               

                                                                                                  

Disposition:                                                                                      

01/15/21 15:13 Discharged to Home. Impression: Pleurisy.                                          

- Condition is Stable.                                                                            

- Discharge Instructions: Pleurisy, Easy-to-Read.                                                 

- Prescriptions for Ibuprofen 600 mg Oral Tablet - take 1 tablet by ORAL route every 6            

  hours As needed take with food; 30 tablet.                                                      

- Medication Reconciliation Form, Thank You Letter, Antibiotic Education, Prescription            

  Opioid Use form.                                                                                

- Follow up: Emergency Department; When: As needed; Reason: Worsening of condition.               

  Follow up: Private Physician; When: 2 - 3 days; Reason: Recheck today's complaints,             

  Continuance of care, Re-evaluation by your physician.                                           

                                                                                                  

                                                                                                  

                                                                                                  

Signatures:                                                                                       

Dispatcher [employer]                           EDMS                                                 

Sharda Sharma, MILLIE-C                 FNP-Lei Freire MD MD kdr Garcia, Victoria, RN                    RN   vg1                                                  

Wojciech Lee RN                       RN   ll1                                                  

                                                                                                  

Corrections: (The following items were deleted from the chart)                                    

01/15                                                                                             

14:00 13:56 Associated signs and symptoms: Pertinent positives: kb                            kb  

15:27 15:13 01/15/2021 15:13 Discharged to Home. Impression: Pleurisy. Condition is Stable.   vg1 

      Forms are Medication Reconciliation Form, Thank You Letter, Antibiotic Education,           

      Prescription Opioid Use. Follow up: Emergency Department; When: As needed; Reason:          

      Worsening of condition. Follow up: Private Physician; When: 2 - 3 days; Reason: Recheck     

      today's complaints, Continuance of care, Re-evaluation by your physician. kb                

                                                                                                  

**************************************************************************************************

## 2021-12-29 ENCOUNTER — HOSPITAL ENCOUNTER (EMERGENCY)
Dept: HOSPITAL 97 - ER | Age: 26
Discharge: LEFT BEFORE DISCHARGE | End: 2021-12-29
Payer: SELF-PAY

## 2021-12-29 DIAGNOSIS — Z02.89: Primary | ICD-10-CM

## 2021-12-29 NOTE — ER
Nurse's Notes                                                                                     

 [facility]                                                                 

Name: Kati Rosado                                                                               

Age: 26 yrs                                                                                       

Sex: Female                                                                                       

: 1995                                                                                   

MRN: [id number]                                                                                   

Arrival Date: 2021                                                                          

Time: 08:30                                                                                       

Account#: [id number]                                                                            

Bed Waiting                                                                                       

Private MD:                                                                                       

Diagnosis:                                                                                        

                                                                                                  

ED Course:                                                                                        

                                                                                             

08:30 Patient arrived in ED.                                                                  as  

08:32 James Gonzalez PA is PHCP.                                                                cp  

08:32 Lei Yee MD is Attending Physician.                                              cp  

09:03 Patient's name was called from ER [facility]. No response.                                   vg1 

09:46 Patient's name was called from ER [facility]. No response. Unable to locate patient. Will    vg1 

      disposition as left without being seen by a provider.                                       

                                                                                                  

Administered Medications:                                                                         

No medications were administered                                                                  

                                                                                                  

                                                                                                  

Outcome:                                                                                          

09:47 Patient left the ED.                                                                    vg1 

10:45 Patient left the ED.                                                                    vg1 

                                                                                                  

Signatures:                                                                                       

Tana Velasquez                             as                                                   

James Gonzalez PA PA cp Garcia, Victoria, RN                    RN   vg1                                                  

                                                                                                  

**************************************************************************************************